# Patient Record
Sex: MALE | Race: WHITE | NOT HISPANIC OR LATINO | Employment: FULL TIME | URBAN - METROPOLITAN AREA
[De-identification: names, ages, dates, MRNs, and addresses within clinical notes are randomized per-mention and may not be internally consistent; named-entity substitution may affect disease eponyms.]

---

## 2023-09-18 ENCOUNTER — OFFICE VISIT (OUTPATIENT)
Dept: URGENT CARE | Facility: CLINIC | Age: 48
End: 2023-09-18
Payer: COMMERCIAL

## 2023-09-18 VITALS
OXYGEN SATURATION: 97 % | SYSTOLIC BLOOD PRESSURE: 126 MMHG | HEART RATE: 103 BPM | TEMPERATURE: 98.7 F | WEIGHT: 222.8 LBS | RESPIRATION RATE: 20 BRPM | DIASTOLIC BLOOD PRESSURE: 96 MMHG

## 2023-09-18 DIAGNOSIS — J02.9 SORE THROAT: ICD-10-CM

## 2023-09-18 DIAGNOSIS — J02.9 ACUTE VIRAL PHARYNGITIS: Primary | ICD-10-CM

## 2023-09-18 LAB — S PYO AG THROAT QL: NEGATIVE

## 2023-09-18 PROCEDURE — 99213 OFFICE O/P EST LOW 20 MIN: CPT

## 2023-09-18 PROCEDURE — 87880 STREP A ASSAY W/OPTIC: CPT

## 2023-09-18 PROCEDURE — 87070 CULTURE OTHR SPECIMN AEROBIC: CPT

## 2023-09-18 RX ORDER — SILDENAFIL 100 MG/1
TABLET, FILM COATED ORAL
COMMUNITY
Start: 2023-07-05

## 2023-09-18 RX ORDER — AZILSARTAN KAMEDOXOMIL AND CHLORTHALIDONE 40; 25 MG/1; MG/1
TABLET ORAL
COMMUNITY
Start: 2023-07-05

## 2023-09-18 NOTE — PROGRESS NOTES
[address] [address] Now        NAME: Ayana Ricardo is a 50 y.o. male  : 1975    MRN: [id number]  DATE: 2023  TIME: 11:20 AM    Assessment and Plan   Acute viral pharyngitis [J02.9]  1. Acute viral pharyngitis        2. Sore throat  POCT rapid strepA        Rapid Strep negative, will send throat culture and follow-up if positive. Patient declined further testing. Suspect viral illness given clinical presentation. VSS in clinic, appears in no acute distress. Educated on use of OTC products for symptoms. Patient verbalizes understanding and agreeable to plan. Patient Instructions     Strep test negative, will send throat culture and  follow-up if positive. Symptoms of a viral infection may linger for up to 10 days. Your contagious period is the first 5-7 days of symptom onset. Continue over-the-counter products for symptoms: tylenol for fevers, ibuprofen for body aches, flonase (fluticasone) with nasal saline for nasal congestion, mucinex for cough, and airborne/emergen-c for vitamin supplementation. Follow-up with PCP in 3-5 days. Report to ER if symptoms worsen. Chief Complaint     Chief Complaint   Patient presents with   • Sore Throat     Patient reported that he has had a sore throat for 4 days. Patient reported that he has also been having a fever. Patient reported he has been using NIGHTQUIL and Gaurang Mink with minimal relief. History of Present Illness       50year old male presents for fever and sore throat for the past 4 days. He denies any known sick contacts or triggers. He denies associated cough, congestion, or difficulty breathing. He has been taking nyquil and karis seltzer for symptoms with improvement of his fever. Sore Throat   This is a new problem. The current episode started in the past 7 days. The problem has been unchanged. Neither side of throat is experiencing more pain than the other. The fever has been present for 1 to 2 days.  The pain is at a severity of 5/10. The pain is mild. Associated symptoms include swollen glands. Pertinent negatives include no abdominal pain, congestion, coughing, diarrhea, drooling, ear discharge, ear pain, headaches, hoarse voice, plugged ear sensation, neck pain, shortness of breath, stridor, trouble swallowing or vomiting. He has had no exposure to strep or mono. Treatments tried: nyquil and karis seltzer. The treatment provided mild relief. Review of Systems   Review of Systems   Constitutional: Positive for fever. Negative for activity change, appetite change, chills and fatigue. HENT: Positive for sore throat. Negative for congestion, drooling, ear discharge, ear pain, hoarse voice, postnasal drip, rhinorrhea, sinus pressure, sinus pain, sneezing and trouble swallowing. Eyes: Negative for visual disturbance. Respiratory: Negative for cough, chest tightness, shortness of breath and stridor. Cardiovascular: Negative for chest pain and palpitations. Gastrointestinal: Negative for abdominal pain, constipation, diarrhea, nausea and vomiting. Musculoskeletal: Negative for neck pain. Skin: Negative for color change and pallor. Allergic/Immunologic: Negative for environmental allergies and food allergies. Neurological: Negative for dizziness, light-headedness and headaches. Current Medications       Current Outpatient Medications:   •  Edarbyclor 40-25 MG TABS, , Disp: , Rfl:   •  sildenafil (VIAGRA) 100 mg tablet, , Disp: , Rfl:     Current Allergies     Allergies as of 09/18/2023   • (No Known Allergies)            The following portions of the patient's history were reviewed and updated as appropriate: allergies, current medications, past family history, past medical history, past social history, past surgical history and problem list.     Past Medical History:   Diagnosis Date   • Hypertension        History reviewed. No pertinent surgical history. History reviewed.  No pertinent family history. Medications have been verified. Objective   /96   Pulse 103   Temp 98.7 °F (37.1 °C) (Tympanic)   Resp 20   Wt 101 kg (222 lb 12.8 oz)   SpO2 97%        Physical Exam     Physical Exam  Vitals and nursing note reviewed. Constitutional:       General: He is awake. Appearance: Normal appearance. He is well-developed and normal weight. HENT:      Head: Normocephalic and atraumatic. Right Ear: Hearing, tympanic membrane, ear canal and external ear normal.      Left Ear: Hearing, tympanic membrane, ear canal and external ear normal.      Nose: No congestion or rhinorrhea. Right Turbinates: Not enlarged, swollen or pale. Left Turbinates: Not enlarged, swollen or pale. Right Sinus: No maxillary sinus tenderness or frontal sinus tenderness. Left Sinus: No maxillary sinus tenderness or frontal sinus tenderness. Mouth/Throat:      Mouth: Mucous membranes are moist.      Pharynx: Oropharynx is clear. Uvula midline. No pharyngeal swelling or posterior oropharyngeal erythema. Tonsils: No tonsillar exudate or tonsillar abscesses. Eyes:      Conjunctiva/sclera: Conjunctivae normal.      Pupils: Pupils are equal, round, and reactive to light. Cardiovascular:      Rate and Rhythm: Regular rhythm. Tachycardia present. Heart sounds: Normal heart sounds. Pulmonary:      Effort: Pulmonary effort is normal.      Breath sounds: Normal breath sounds. Musculoskeletal:         General: Normal range of motion. Cervical back: Full passive range of motion without pain and normal range of motion. Lymphadenopathy:      Cervical: Cervical adenopathy present. Skin:     General: Skin is warm and dry. Capillary Refill: Capillary refill takes less than 2 seconds. Neurological:      General: No focal deficit present. Mental Status: He is alert and oriented to person, place, and time.    Psychiatric:         Mood and Affect: Mood normal. Behavior: Behavior normal. Behavior is cooperative. Thought Content:  Thought content normal.         Judgment: Judgment normal.

## 2023-09-18 NOTE — PATIENT INSTRUCTIONS
Strep test negative, will send throat culture and  follow-up if positive. Symptoms of a viral infection may linger for up to 10 days. Your contagious period is the first 5-7 days of symptom onset. Continue over-the-counter products for symptoms: tylenol for fevers, ibuprofen for body aches, flonase (fluticasone) with nasal saline for nasal congestion, mucinex for cough, and airborne/emergen-c for vitamin supplementation. Follow-up with PCP in 3-5 days. Report to ER if symptoms worsen.

## 2023-09-20 LAB — BACTERIA THROAT CULT: NORMAL

## 2024-11-14 ENCOUNTER — OFFICE VISIT (OUTPATIENT)
Dept: URGENT CARE | Facility: CLINIC | Age: 49
End: 2024-11-14
Payer: COMMERCIAL

## 2024-11-14 VITALS
DIASTOLIC BLOOD PRESSURE: 78 MMHG | TEMPERATURE: 97.1 F | OXYGEN SATURATION: 96 % | SYSTOLIC BLOOD PRESSURE: 150 MMHG | HEART RATE: 96 BPM | WEIGHT: 233 LBS | RESPIRATION RATE: 18 BRPM

## 2024-11-14 DIAGNOSIS — J01.10 ACUTE NON-RECURRENT FRONTAL SINUSITIS: Primary | ICD-10-CM

## 2024-11-14 PROCEDURE — 99213 OFFICE O/P EST LOW 20 MIN: CPT | Performed by: PHYSICIAN ASSISTANT

## 2024-11-14 NOTE — PROGRESS NOTES
[facility]        NAME: Sincere Robles is a 49 y.o. male  : 1975    MRN: [id number]  DATE: 2024  TIME: 4:36 PM    Assessment and Plan   Acute non-recurrent frontal sinusitis [J01.10]  1. Acute non-recurrent frontal sinusitis  amoxicillin-clavulanate (AUGMENTIN) 875-125 mg per tablet    dextromethorphan-guaifenesin (MUCINEX DM)  MG per 12 hr tablet            Patient Instructions       Follow up with PCP in 3-5 days.  Proceed to  ER if symptoms worsen.    If tests are performed, our office will contact you with results only if changes need to made to the care plan discussed with you at the visit. You can review your full results on [facility].    Chief Complaint     Chief Complaint   Patient presents with    Cold Like Symptoms     Pt c/o sore throat bilateral ear pain dry cough and has been going on for 2 days and has tried cough syrup         History of Present Illness       Sore Throat   This is a new problem. The current episode started in the past 7 days. The problem has been gradually worsening. There has been no fever. The pain is mild. Associated symptoms include congestion and coughing. Pertinent negatives include no abdominal pain, diarrhea, drooling, ear discharge, headaches, hoarse voice, plugged ear sensation, shortness of breath, swollen glands or vomiting. He has had no exposure to strep or mono. He has tried nothing for the symptoms.       Review of Systems   Review of Systems   Constitutional:  Negative for activity change, appetite change, chills, fatigue and fever.   HENT:  Positive for congestion, postnasal drip, rhinorrhea, sinus pressure and sore throat. Negative for drooling, ear discharge and hoarse voice.    Respiratory:  Positive for cough. Negative for shortness of breath.    Gastrointestinal:  Negative for abdominal pain, diarrhea and vomiting.   Skin:  Negative for rash.   Neurological:  Negative for headaches.         Current Medications        Current Outpatient Medications:     amoxicillin-clavulanate (AUGMENTIN) 875-125 mg per tablet, Take 1 tablet by mouth every 12 (twelve) hours for 7 days, Disp: 14 tablet, Rfl: 0    dextromethorphan-guaifenesin (MUCINEX DM)  MG per 12 hr tablet, Take 1 tablet by mouth every 12 (twelve) hours, Disp: 60 tablet, Rfl: 0    Edarbyclor 40-25 MG TABS, , Disp: , Rfl:     sildenafil (VIAGRA) 100 mg tablet, , Disp: , Rfl:     Current Allergies     Allergies as of 11/14/2024    (No Known Allergies)            The following portions of the patient's history were reviewed and updated as appropriate: allergies, current medications, past family history, past medical history, past social history, past surgical history and problem list.     Past Medical History:   Diagnosis Date    Hypertension        History reviewed. No pertinent surgical history.    History reviewed. No pertinent family history.      Medications have been verified.        Objective   /78   Pulse 96   Temp (!) 97.1 °F (36.2 °C) (Tympanic)   Resp 18   Wt 106 kg (233 lb)   SpO2 96%        Physical Exam     Physical Exam  Vitals and nursing note reviewed.   Constitutional:       General: He is not in acute distress.     Appearance: Normal appearance. He is normal weight. He is not ill-appearing, toxic-appearing or diaphoretic.   HENT:      Right Ear: Tympanic membrane, ear canal and external ear normal.      Left Ear: Tympanic membrane, ear canal and external ear normal.      Nose: Congestion and rhinorrhea present.      Mouth/Throat:      Mouth: Mucous membranes are moist.      Pharynx: Oropharynx is clear. No oropharyngeal exudate.      Comments: Hyperemic with post purulent discharge  Eyes:      General: No scleral icterus.     Extraocular Movements: Extraocular movements intact.      Conjunctiva/sclera: Conjunctivae normal.      Pupils: Pupils are equal, round, and reactive to light.   Cardiovascular:      Rate and Rhythm: Normal rate and  regular rhythm.      Pulses: Normal pulses.      Heart sounds: Normal heart sounds.   Pulmonary:      Effort: Pulmonary effort is normal. No respiratory distress.      Breath sounds: Normal breath sounds.   Musculoskeletal:         General: Normal range of motion.      Cervical back: Normal range of motion and neck supple. No tenderness.   Lymphadenopathy:      Cervical: No cervical adenopathy.   Skin:     General: Skin is warm and dry.      Findings: No rash.   Neurological:      General: No focal deficit present.      Mental Status: He is alert and oriented to person, place, and time.      Coordination: Coordination normal.      Gait: Gait normal.   Psychiatric:         Mood and Affect: Mood normal.         Behavior: Behavior normal.         Thought Content: Thought content normal.         Judgment: Judgment normal.

## 2024-11-14 NOTE — PATIENT INSTRUCTIONS
Patient Education     ??????? ? ????????   ??????   ?????????? ??????? ? ??????????? UpToDate   ??? ????? ???????? -- ??????? -- ??? ???????????, ??????? ????? ???????? ???????????? ????, ??????????? ???????? ? ??????? ???? ? ????????? ??? «?????» ?? ????.  ?????? -- ??? ????? ??????? ? ?????? ???? (???. 1). ??? ??????? ?????? ????????? ?????????, ??????? ?????? ???????? ????????? ?????????? ?????. ????? ??? ????????? ???????? ?????????? ??? ????????????, ??? ???????? ? ???????? ?????????????? ?????. ??? ?????? ???????? ????????????? ?????????.  ??????? ?????? ????????? ????? ?????? ????????. ???????, ?????????? ????????, ????? ????? ???????????? ? ??????? ??????. ?????? ???????? ???????? ????? ???? ?????? ???????. ????? ??????? ?????????, ??? ???????? ????????. ?? ????? ?????????? ????????? ? ????? ???????? ??????????? ???? ?????.  ?????? ???????? ????????? -- ????? ???????? ???????? ????????:   ?????????? ??? ???????????? ???.   ?????? ?????, ?????? ??? ??????? ????????? ?? ????.   ?????? ????.   ???? ??? ??????? ??????????? ? ??????? ????. ????? ???????? ??????????? ??? ??????? ??????.  ??? ???????? ????? ????? ????????? ?????? ????????, ????? ???:   ?????????;   ??????;   ???????? ? ?????????;   ??????? ??????????? ? ???? ??? ???????????? ????;   ???????? ????;   ?????????? ????? ??? ???;   ??????? ?????????.  ? ??????????? ??????? ???????? ???????? ?????????? ????? 7-10 ????.  ??????? ?? ??? ?????????? ? ????? ??? ?????????? -- ?????????? ? ???????? ????? ??? ?????????, ???? ???? ???????? ??????????? ????? 10 ???? ??? ???? ??? ??????? ??????????, ?? ????? ????? ???????????.  ? ?????? ??????? ??????? ????? ???????? ? ????????? ?????????. ?????????? ?????????? ? ????? ??? ????????? (?? ????? 10 ????), ???? ? ???:   ??????????? ???? 102 °F (38,9 °C)   ????????? ? ??????? ???? ? ??????? ???? ? ??????.   ???????? ?? ??????? ??? ??????? ? ??????.   ???????? ? ?????????.   ???? ??? ??????????? ?????? ?????? ??? ?????  ????.   ??????????? ???? ???.  ? ???? ???-?????? ??????? ??????????????, ????? ????????????? ???? ?????? -- ??. ????? ???????? ????????, ?????:   ????????? ?????????????? ??????????????, ????? ????????? ????.   ????????? ??? ? ?????? ??????? ????? ????????? ??? ? ????. ??????? ? ?????? ????? ??? ?????????, ??? ????? ????? ??? ???????.   ???? ????? ????????. ??????????? ?????????? ????? ?????? ??????? ????? ???? ? ????????? ?? ?????????.  ??????? ???? ????? ????? ??????????????? ?????????? ????? ??? ????, ????? ????????? ???? ????, ???????? ???? ? ??? ????????. ?????????? ? ??????? ??????, ???? ?? ??????????? ???????????? ?????????? ?????.  ??? ??????? ???????? -- ? ??????????? ??????? ??????? ?? ??????? ?????? ?????????????. ??? ??????????? ???, ??? ??????????? ????????? ?????????? ????????, ? ?? ??????????, ? ??????????? ?? ??????? ??????. ??????????, ???? ???????, ????????? ??????????, ?????? ???????? ??? ?? ????, ??? ????????????.  ?????? ??? ???????? ????????? ??????? ?????????????. ???? ???????? ?? ??????? ????? 10 ????, ??????? ? ???????? ?????, ??????? ?? ??? ????????? ???????????. ?? ????? ??????????????? ??? ????????? ??? ???? ??????, ????? ??????????, ????????? ?? ???? ????????. ?? ???? ? ??? ??????? ????? ????????, ??? ??? ??? ??????? ????, ??? ????? ????????? ???????????. ???? ?? ?????????? ???????????, ???????? ???? ????????? ????? ?? ?? ??????.  ??? ??????, ???? ???????? ?? ????????? -- ???? ???????? ?? ??????????, ????????????? ? ??????? ?????? ??? ??????????. ??? ????? ????????? ????????????, ????? ????????, ?????? ? ??? ?? ???????? ????????. ? ????? ???????????? ????????????? ????.   ?? ??? ?????? ??????????????? ????????????. ??? ??????????????? ???????????? ??????????? ??????????? ?????????? ???????? ?????????.   ???????????? ??? ??????? ????? ????. ??? ????? ???????????? ???? ?????? ?????? ?????? ? ??????? ?? ????? ? ??? ? ?????, ? ??????.  ? ????????? ????? ???????? ??????? ????? ?????????  ?????, ??? ?? ? ??? ??????????? ????????, ??????? ?????? ?? ????? 3 ???????. ? ???? ????? ????? ???? ?????? ??? ????????, ?????????? ??????????? ?????????. ??????????? ??????? ????? ???? ?????? ??????? ?????????. ????????, ? ????????? ????? ?????? ???? ??? ????? ??????? ???????????, ?????????? ????????. ? ?????? ???? ????????, ??????? ???????? ????????.  ??????????? ??????? ????? ?????? ??-???????. ???? ? ??? ??????????? ???????, ????????????? ? ??????? ?????? ? ???, ????? ??????? ???????? ?????? ???.  ??? ???? ??????????? ?? ???? ????????? ????? ????????????, ????? ???? ??? ??? ??????? ?????????????? ????????.  ??? ???? ????? ? UpToDate: Feb 28, 2024.  ???? 19302 ?????? [id number].[id number]  Release: 32.2.4 - C32.58  © 2024 [employer] ? (???) ?????????????? ??????????? All rights reserved.  ???. 1: ?????? ????     ?????? -- ??? ????? ??????? ? ??????? ??????. ?? ???? ??????? ???????? ???????????? ???????, ???? ????? ? ???????. ?????????? 4 ???? ???????, ????????? ?? ?????????? ?? ??????: ???????????, ??????, ?????????? ? ???????????????.  Graphic [id number] Version 3.0  ????????????? ?????????? ???????????? ? ????????? ?? ??????????? ???????????????   ????????????? ?????????? ???????????? ? ????????? ?? ??????????? ???????????????: ??? ?????????? ?????????? ???????? ??????? ???????? ? ????????, ??????? ?/??? ????????????? ?????????. ??? ?? ???????? ????????????? ?????????? ?????? ? ?????? ?????????????? ? ???????? ???????????, ??????????? ???????????? ?????? ? (???) ??????? ????????????? ???????? ??????????? ? ???????. ??? ?? ???????? ? ???? ??? ?????????? ? ??????????, ??????? ???????, ??????????, ???????? ???????? ??? ??????, ??????? ????? ?????????? ? ??????????? ????????. ??? ?? ????? ????????? ??????????? ????????????? ???? ???????? ???????????? ?????, ? ????? ??????????????? ???????????? ??? ???????, ??????????? ?????? ?? ????????? ???????????? ? ?????? ????????????? ? ?????????? ?????????????, ??????????? ? ????????.  ???????? ?????? ???????????????????? ? ?????? ??? ????????? ?????? ?????????? ? ????? ????????, ??????????? ???????? ? ????????? ???????, ??????? ????? ????? ??? ???????????? ? ????????? ????????????? ????????. ?????? ?????????? ?? ???????? ????????? ????, ??? ??? ??????? ??? ????????????? ???????? ?????????, ?????????? ??? ??????? ??? ??????? ?????????? ?????????. ??????????? [employer] ? ??? ???????? ??????????? ???????????? ?? ????? ???????? ??? ????????????, ????????? ? ???? ??????????? ??? ?? ??????????????.????????????? ???? ?????????? ???????????? ????????? ?????????????, ??????????????? ?? ???-???????? [email or web address] © [employer] ? ?? ?????????????? ???? ?/??? ??????????, 2024 ?. ??? ????? ????????.  ????????? ?????   © 2024 [employer] ? (???) ?????????????? ??????????? All rights reserved.